# Patient Record
(demographics unavailable — no encounter records)

---

## 2024-11-22 NOTE — REVIEW OF SYSTEMS
[Anxiety] : anxiety [Fever] : no fever [Shortness Of Breath] : no shortness of breath [Wheezing] : no wheezing [Cough] : no cough [Abdominal Pain] : no abdominal pain [Nausea] : no nausea [Constipation] : no constipation [Dysuria] : no dysuria [Joint Pain] : no joint pain

## 2024-11-22 NOTE — PHYSICAL EXAM
[No Acute Distress] : no acute distress [No Respiratory Distress] : no respiratory distress  [No Accessory Muscle Use] : no accessory muscle use [Clear to Auscultation] : lungs were clear to auscultation bilaterally [Normal Rate] : normal rate  [Regular Rhythm] : with a regular rhythm [Normal S1, S2] : normal S1 and S2 [Soft] : abdomen soft [Non Tender] : non-tender [Non-distended] : non-distended [No Joint Swelling] : no joint swelling [Coordination Grossly Intact] : coordination grossly intact [Normal Affect] : the affect was normal [Normal Insight/Judgement] : insight and judgment were intact

## 2024-11-22 NOTE — HEALTH RISK ASSESSMENT
[0] : 2) Feeling down, depressed, or hopeless: Not at all (0) [PHQ-2 Negative - No further assessment needed] : PHQ-2 Negative - No further assessment needed [[id number]] : 0 [Current] : Current

## 2024-11-22 NOTE — HISTORY OF PRESENT ILLNESS
[FreeTextEntry1] : HIV Follow up PHQ-2(0PTS)  [de-identified] : 44 year old male with HIV (AIDS diag in 2012 in [place]), Hepatitis B, Syphilis (treated), genital herpes, plantar warts, periodontitis and maxillary bone disease presents for follow up on HIV. Patient established care here with us on 8/29/2024 and  is a patient of ours as well.  Switched to Biktarvy yesterday and felt slight malaise yesterday, but feels fine today. He is experiencing difficulties in his relationship and having a lot of anxiety and insomnia associated with it.

## 2024-12-13 NOTE — HISTORY OF PRESENT ILLNESS
[FreeTextEntry3] : Reference #: [id number]  A	N	Y	B	11/22/2024	11/22/2024	alprazolam 0.5 mg tablet	30	30	Mariia Hawkins	[id number]	[facility]

## 2025-02-24 NOTE — PHYSICAL EXAM
[No Acute Distress] : no acute distress [No Respiratory Distress] : no respiratory distress  [No Accessory Muscle Use] : no accessory muscle use [Clear to Auscultation] : lungs were clear to auscultation bilaterally [Normal Rate] : normal rate  [Regular Rhythm] : with a regular rhythm [Soft] : abdomen soft [Non Tender] : non-tender [No Joint Swelling] : no joint swelling [No Rash] : no rash [Coordination Grossly Intact] : coordination grossly intact [Normal Affect] : the affect was normal [Normal Insight/Judgement] : insight and judgment were intact

## 2025-02-24 NOTE — HISTORY OF PRESENT ILLNESS
[FreeTextEntry1] : Follow up on HIV [de-identified] : 4 year old male with HIV (AIDS diag in 2012 in [place]), Hepatitis B, Syphilis (treated), genital herpes, plantar warts, periodontitis and maxillary bone disease presents for follow up on HIV. Ongoing difficulties in relationship, still causing anxiety, but otherwise doing well. Having some mild low back pain. Non-radiating.

## 2025-02-24 NOTE — HEALTH RISK ASSESSMENT
[No] : No [0] : 2) Feeling down, depressed, or hopeless: Not at all (0) [Never] : Never [[id number]] : 0

## 2025-02-24 NOTE — REVIEW OF SYSTEMS
[Anxiety] : anxiety [Fever] : no fever [Chest Pain] : no chest pain [Palpitations] : no palpitations [Lower Ext Edema] : no lower extremity edema [Shortness Of Breath] : no shortness of breath [Wheezing] : no wheezing [Cough] : no cough [Dyspnea on Exertion] : not dyspnea on exertion [Abdominal Pain] : no abdominal pain [Nausea] : no nausea [Constipation] : no constipation [Vomiting] : no vomiting [Dysuria] : no dysuria [Joint Pain] : no joint pain [Skin Rash] : no skin rash

## 2025-03-26 NOTE — ASSESSMENT
[FreeTextEntry1] : 45M with a h/o HIV (dx AIDS 2012), hep B (most likely resolved given negative PCR), Syphilis (treated), genital herpes, plantar warts, periodontitis and maxillary bone disease who presents for CRC screening.  #CRC screening - schedule [facility] preference  - risks/benefits/alternatives and need for post-procedural escort discussed  #Dyspepsia #Belching #Epigastric discomfort - schedule EGD at time of colonoscopy to further evaluate - he did grow up in [place], which has higher rates of UGI cancers and H pylori   Ban Man MD Gastroenterology

## 2025-03-26 NOTE — HISTORY OF PRESENT ILLNESS
[FreeTextEntry1] : 45M with a h/o HIV (dx AIDS ), hep B (most likely resolved given negative PCR), Syphilis (treated), genital herpes, plantar warts, periodontitis and maxillary bone disease who presents for CRC screening. He does have excessive belching, epigastric discomfort. He thinks it is sometimes related to diet, but sometimes it occurs when he is nervous. Denies constipation, diarrhea, n/v, abd pain.   Last colonoscopy about 10 years ago in [place], reportedly wnl. EGD done at same time, wnl.   Family Hx: father  of colon cancer (age 76)  Social Hx: former smoker (1/2 ppd x6 months, quit 2 months ago), no EtOH, no recreational drugs, works as

## 2025-07-23 NOTE — HISTORY OF PRESENT ILLNESS
[FreeTextEntry1] : Discuss medication [de-identified] : 45 year old male with HIV (well controlled on Biktarvy), hep B, syphilis (s/p tx), genital herpes, plantar warts, periodontitis, and maxillary bone dz presenting for follow up to discuss:  -Insurance is no longer covering Biktarvy. He wants to be on juluca like his partner. Explained that h/o of hep B means that stopping tenofovir brings a risk of reactivation, even though he has been undetectable. He was taking Descovy+Isentress in [place] and this is preferable for him because if he ever ends up having to go back to [place], he can easily switch back. We would prefer Descovy+Tivicay because of a higher barrier to resistance, but can do resistance testing and can switch to Isentress depending on results.  -Chest pain - left side. Happened last night when exerting self. Felt like chest was "stretching open." No radiation or SOB.   -Ongoing back pain - seeing chiropractor   -Had colonoscopy and endoscopy 5/16/2025 - 2 poylps - follow up in 5 yrs. EGD with non-erosive gastritis.

## 2025-07-23 NOTE — PHYSICAL EXAM
[No Acute Distress] : no acute distress [No Respiratory Distress] : no respiratory distress  [No Accessory Muscle Use] : no accessory muscle use [Clear to Auscultation] : lungs were clear to auscultation bilaterally [Normal Rate] : normal rate  [Regular Rhythm] : with a regular rhythm [Normal S1, S2] : normal S1 and S2 [Soft] : abdomen soft [Non Tender] : non-tender [No Joint Swelling] : no joint swelling [No Rash] : no rash [Coordination Grossly Intact] : coordination grossly intact [Normal Affect] : the affect was normal [Normal Insight/Judgement] : insight and judgment were intact

## 2025-07-23 NOTE — HISTORY OF PRESENT ILLNESS
[FreeTextEntry1] : Discuss medication [de-identified] : 45 year old male with HIV (well controlled on Biktarvy), hep B, syphilis (s/p tx), genital herpes, plantar warts, periodontitis, and maxillary bone dz presenting for follow up to discuss:  -Insurance is no longer covering Biktarvy. He wants to be on juluca like his partner. Explained that h/o of hep B means that stopping tenofovir brings a risk of reactivation, even though he has been undetectable. He was taking Descovy+Isentress in [place] and this is preferable for him because if he ever ends up having to go back to [place], he can easily switch back. We would prefer Descovy+Tivicay because of a higher barrier to resistance, but can do resistance testing and can switch to Isentress depending on results.  -Chest pain - left side. Happened last night when exerting self. Felt like chest was "stretching open." No radiation or SOB.   -Ongoing back pain - seeing chiropractor   -Had colonoscopy and endoscopy 5/16/2025 - 2 poylps - follow up in 5 yrs. EGD with non-erosive gastritis.

## 2025-07-23 NOTE — HEALTH RISK ASSESSMENT
[0] : 2) Feeling down, depressed, or hopeless: Not at all (0) [Never] : Never [[id number]] : 0 High Dose Vitamin A Pregnancy And Lactation Text: High dose vitamin A therapy is contraindicated during pregnancy and breast feeding.

## 2025-07-23 NOTE — REVIEW OF SYSTEMS
[Chest Pain] : chest pain [Fever] : no fever [Palpitations] : no palpitations [Lower Ext Edema] : no lower extremity edema [Shortness Of Breath] : no shortness of breath [Cough] : no cough [Dyspnea on Exertion] : not dyspnea on exertion [Abdominal Pain] : no abdominal pain [Dysuria] : no dysuria [Joint Pain] : no joint pain [Itching] : no itching [Headache] : no headache